# Patient Record
Sex: MALE | Race: WHITE | Employment: OTHER | URBAN - METROPOLITAN AREA
[De-identification: names, ages, dates, MRNs, and addresses within clinical notes are randomized per-mention and may not be internally consistent; named-entity substitution may affect disease eponyms.]

---

## 2019-03-18 ENCOUNTER — OFFICE VISIT (OUTPATIENT)
Dept: UROLOGY | Age: 69
End: 2019-03-18

## 2019-03-18 VITALS
SYSTOLIC BLOOD PRESSURE: 110 MMHG | HEART RATE: 73 BPM | OXYGEN SATURATION: 96 % | DIASTOLIC BLOOD PRESSURE: 74 MMHG | HEIGHT: 72 IN | WEIGHT: 202 LBS | BODY MASS INDEX: 27.36 KG/M2

## 2019-03-18 DIAGNOSIS — N13.8 BPH WITH OBSTRUCTION/LOWER URINARY TRACT SYMPTOMS: Primary | ICD-10-CM

## 2019-03-18 DIAGNOSIS — N40.1 BPH WITH OBSTRUCTION/LOWER URINARY TRACT SYMPTOMS: Primary | ICD-10-CM

## 2019-03-18 LAB
BILIRUB UR QL STRIP: NEGATIVE
GLUCOSE UR-MCNC: NEGATIVE MG/DL
KETONES P FAST UR STRIP-MCNC: NORMAL MG/DL
PH UR STRIP: 7 [PH] (ref 4.6–8)
PROT UR QL STRIP: NEGATIVE
SP GR UR STRIP: 1.02 (ref 1–1.03)
UA UROBILINOGEN AMB POC: NORMAL (ref 0.2–1)
URINALYSIS CLARITY POC: CLEAR
URINALYSIS COLOR POC: YELLOW
URINE BLOOD POC: NEGATIVE
URINE LEUKOCYTES POC: NEGATIVE
URINE NITRITES POC: NEGATIVE

## 2019-03-18 NOTE — PROGRESS NOTES
Mr. Chalino Garcia has a reminder for a \"due or due soon\" health maintenance. I have asked that he contact his primary care provider for follow-up on this health maintenance.

## 2019-03-19 NOTE — PROGRESS NOTES
[place] 70 y.o. male     Mr. Brennon Sánchez seen today for evaluation of obstructive and irritative voiding symptoms progressing to ernestine urinary retention 2 episodes most recently 2 weeks ago on alpha-blocker therapy with Flomax prompting presentation to the emergency room where Rodriguez catheter was passed and maintained for several days   Patient describes weak intermittent hesitant urinary stream with nocturia 2-3 episodes per night not significantly improved on Flomax Rx 1 year ago by primary physician  No history of  tract disease, trauma, or surgery  No history of fever chills dysuria or gross hematuria    PSA 2.6 in April 2018    PVR 25 cc in March 2019    Creatinine 1.0 in May 2018      Review of Systems:   CNS no seizures syncope headaches dizziness or visual changes:   Respiratory: No wheezing shortness of breath chest pain or coughing  Cardiovascular: No palpitations no angina  Intestinal: No dyspepsia diarrhea or constipation   urinary: Urgency frequency and nocturia with weak hesitant intermittent urinary stream  Skeletal: No bone or joint pain  Endocrine: No diabetes or thyroid disease  Other:    Allergies: Allergies   Allergen Reactions    Nsaids (Non-Steroidal Anti-Inflammatory Drug) Anaphylaxis      Medications:    Current Outpatient Medications   Medication Sig Dispense Refill    nitrofurantoin, macrocrystal-monohydrate, (MACROBID) 100 mg capsule Take 1 Cap by mouth two (2) times a day for 7 days. 14 Cap 0    tamsulosin (FLOMAX) 0.4 mg capsule Take 0.4 mg by mouth daily.  multivitamin (ONE A DAY) tablet Take 1 Tab by mouth daily.          Past Medical History:   Diagnosis Date    Diverticulitis     Prostate enlargement     per pt last physical (2018) was told \"slightly enlarged\"      Past Surgical History:   Procedure Laterality Date    HX KNEE REPLACEMENT Bilateral     HX ROTATOR CUFF REPAIR Bilateral     HX SMALL BOWEL RESECTION      PART REMOVAL COLON W ANASTOMOSIS Social History     Socioeconomic History    Marital status:      Spouse name: Not on file    Number of children: Not on file    Years of education: Not on file    Highest education level: Not on file   Social Needs    Financial resource strain: Not on file    Food insecurity - worry: Not on file    Food insecurity - inability: Not on file    Transportation needs - medical: Not on file   [place] needs - non-medical: Not on file   Occupational History    Not on file   Tobacco Use    Smoking status: Never Smoker    Smokeless tobacco: Never Used   Substance and Sexual Activity    Alcohol use: Yes     Alcohol/week: 2.4 oz     Types: 4 Cans of beer per week    Drug use: No    Sexual activity: Not on file   Other Topics Concern    Not on file   Social History Narrative    Not on file      History reviewed. No pertinent family history.      Physical Examination: Well-nourished mature male in no apparent distress    Abdomen is nontender no palpable masses no organomegaly  Back-no percussion CVA tenderness on either side  No inguinal hernia or adenopathy  Penis is normal  Testes are normal in size shape and consistency bilaterally-no epididymal induration or tenderness on either side  Spermatic cords are palpably normal bilaterally  Scrotum is normal  Prostate by VAHID is rounded, smooth, benign in consistency and nontender-no induration no nodularity  No rectal masses tenderness or induration      Urinalysis: Negative dipstick/nitrite negative/heme-negative    PVR today 25 cc                                    prostate volume 57 cc    Impression: Symptomatic BPH progressing to ernestine urinary retention on alpha-blocker Rx        Plan: TURP is indicated and recommended    Described discussed technique of TURP including Dr. Valente Florez illustrations    PC 3 weeks for cystoscopy/      Fouzia Avery MD  -electronically signed-    PLEASE NOTE:  This document has been produced using voice recognition software. Unrecognized errors in transcription may be present.

## 2019-03-25 ENCOUNTER — OFFICE VISIT (OUTPATIENT)
Dept: UROLOGY | Age: 69
End: 2019-03-25

## 2019-03-25 VITALS
SYSTOLIC BLOOD PRESSURE: 133 MMHG | WEIGHT: 202 LBS | HEART RATE: 72 BPM | DIASTOLIC BLOOD PRESSURE: 78 MMHG | HEIGHT: 72 IN | BODY MASS INDEX: 27.36 KG/M2 | OXYGEN SATURATION: 97 %

## 2019-03-25 DIAGNOSIS — R33.8 BENIGN PROSTATIC HYPERPLASIA WITH URINARY RETENTION: Primary | ICD-10-CM

## 2019-03-25 DIAGNOSIS — N40.1 URINARY RETENTION DUE TO BENIGN PROSTATIC HYPERPLASIA: ICD-10-CM

## 2019-03-25 DIAGNOSIS — N40.1 BENIGN PROSTATIC HYPERPLASIA WITH URINARY RETENTION: Primary | ICD-10-CM

## 2019-03-25 DIAGNOSIS — R33.8 URINARY RETENTION DUE TO BENIGN PROSTATIC HYPERPLASIA: ICD-10-CM

## 2019-03-25 LAB
BILIRUB UR QL STRIP: NEGATIVE
GLUCOSE UR-MCNC: NEGATIVE MG/DL
KETONES P FAST UR STRIP-MCNC: ABNORMAL MG/DL
PH UR STRIP: 5 [PH] (ref 4.6–8)
PROT UR QL STRIP: NEGATIVE
SP GR UR STRIP: 1.03 (ref 1–1.03)
UA UROBILINOGEN AMB POC: ABNORMAL (ref 0.2–1)
URINALYSIS CLARITY POC: CLEAR
URINALYSIS COLOR POC: YELLOW
URINE BLOOD POC: NEGATIVE
URINE LEUKOCYTES POC: NEGATIVE
URINE NITRITES POC: NEGATIVE

## 2019-03-25 RX ORDER — CIPROFLOXACIN 750 MG/1
750 TABLET, FILM COATED ORAL 2 TIMES DAILY
Qty: 20 TAB | Refills: 0 | Status: SHIPPED | OUTPATIENT
Start: 2019-03-25 | End: 2019-04-04

## 2019-03-25 NOTE — PROGRESS NOTES
Jim Haque 70 y.o. male     Mr. Perez Cos seen today for cystoscopy assessing symptomatic bladder outlet obstruction progressing to ernestine retention responding favorably to alpha-blocker therapy  Here today for assessment of prostate size and configuration  obstructive and irritative voiding symptoms progressing to ernestine urinary retention 2 episodes most recently 2 weeks ago on alpha-blocker therapy with Flomax prompting presentation to the emergency room where Rodriguez catheter was passed and maintained for several days   Patient describes weak intermittent hesitant urinary stream with nocturia 2-3 episodes per night not significantly improved on Flomax Rx 1 year ago by primary physician  No history of  tract disease, trauma, or surgery  No history of fever chills dysuria or gross hematuria     PSA 2.6 in April 2018     PVR 25 cc in March 2019     Creatinine 1.0 in May 2018     Review of Systems:   CNS no seizures syncope headaches dizziness or visual changes:   Respiratory: No wheezing shortness of breath chest pain or coughing  Cardiovascular: No palpitations no angina  Intestinal: No dyspepsia diarrhea or constipation   urinary: Urgency frequency and nocturia with weak hesitant intermittent urinary stream  Skeletal: No bone or joint pain  Endocrine: No diabetes or thyroid disease  Other:      Allergies: Allergies   Allergen Reactions    Nsaids (Non-Steroidal Anti-Inflammatory Drug) Anaphylaxis and Swelling      Medications:    Current Outpatient Medications   Medication Sig Dispense Refill    tamsulosin (FLOMAX) 0.4 mg capsule Take 0.4 mg by mouth daily.  multivitamin (ONE A DAY) tablet Take 1 Tab by mouth daily.          Past Medical History:   Diagnosis Date    Diverticulitis     Prostate enlargement     per pt last physical (2018) was told \"slightly enlarged\"      Past Surgical History:   Procedure Laterality Date    HX KNEE REPLACEMENT Bilateral     HX ROTATOR CUFF REPAIR Bilateral     HX SMALL BOWEL RESECTION      PART REMOVAL COLON W ANASTOMOSIS       Social History     Socioeconomic History    Marital status:      Spouse name: Not on file    Number of children: Not on file    Years of education: Not on file    Highest education level: Not on file   Occupational History    Not on file   Social Needs    Financial resource strain: Not on file    Food insecurity:     Worry: Not on file     Inability: Not on file    Transportation needs:     Medical: Not on file     Non-medical: Not on file   Tobacco Use    Smoking status: Never Smoker    Smokeless tobacco: Never Used   Substance and Sexual Activity    Alcohol use: Yes     Alcohol/week: 2.4 oz     Types: 4 Cans of beer per week    Drug use: No    Sexual activity: Not on file   Lifestyle    Physical activity:     Days per week: Not on file     Minutes per session: Not on file    Stress: Not on file   Relationships    Social connections:     Talks on phone: Not on file     Gets together: Not on file     Attends [employer] service: Not on file     Active member of club or organization: Not on file     Attends meetings of clubs or organizations: Not on file     Relationship status: Not on file    Intimate partner violence:     Fear of current or ex partner: Not on file     Emotionally abused: Not on file     Physically abused: Not on file     Forced sexual activity: Not on file   Other Topics Concern    Not on file   Social History Narrative    Not on file      History reviewed. No pertinent family history. Physical Examination: Well-nourished mature male in no apparent distress    Large benign consistency prostate by VAHID      Urinalysis: Negative heme/negative nitrite    Cystoscopy Report: After prepping the glans penis with Betadine solution and instilling 2% lidocaine jelly intraurethrally a flexible cystoscope was passed through the urethra into the bladder revealing normal urothelium throughout.   There are no strictures, stones, tumors, or diverticuli. Moderate trabeculation is noted throughout without cellule formation. There is moderate enlargement of the median and anterior lobes protruding mildly intravesically with marked injection and friability of blood vessels in the bladder outlet-ernestine bleeding prompted by simple touch of the cystoscope tip ureteral orifices are both slitlike in appearance with clear urine jets observed from both sides. The lateral lobes of the prostate are tightly coapting in the midline-estimated prostate channel length 5-1/2 cm  Procedure was uncomplicated and well-tolerated  EBL- minimal  Specimen-none      Impression: Anatomic obstruction of bladder outlet secondary to BPH        Plan: Cipro 500 mg twice daily times 3 days    TURP is indicated and recommended    Today regarding technique of TURP-Dr. Ctaalan Plater illustrations utilized-refer to Internet for video depiction of TURP procedure RTC    3 months for scheduling up TURP went patient's activity schedule accommodates this procedure more readily      More than 1/2 of this 25 minute visit was spent in counselling and coordination of care, as described above. Peggy Mac MD  -electronically signed-    PLEASE NOTE:  This document has been produced using voice recognition software. Unrecognized errors in transcription may be present.

## 2019-03-25 NOTE — PATIENT INSTRUCTIONS
Cystoscopy: Care Instructions  Your Care Instructions  Cystoscopy is a test. It uses a thin, lighted tube called a cystoscope to see the inside of the bladder and the urethra. The urethra is the tube that carries urine out of the body. This test is helpful because it lets your doctor see areas of your bladder and urethra that are hard to see on X-rays. It can help your doctor find bladder stones, tumors, bleeding, and infection. During this test, your doctor also can take tissue and urine samples. And if your doctor finds small stones or growths, he or she can remove them. In most cases the scope is in the bladder for less than 10 minutes. But the entire test may take 45 minutes or longer. You will probably get local anesthesia. This numbs a small part of your body. Or you may get spinal anesthesia, which numbs more of your body. Once in a while, doctors use general anesthesia. It makes you sleep during surgery. If you get a local anesthetic, you may be able to get up right after the test. But if you had spinal or general anesthesia, you will stay in the recovery room until you are able to walk or you have feeling again in your lower body. This usually takes about an hour. Your doctor may be able to tell you some of the results right after the test. But the complete results may take several days. Follow-up care is a key part of your treatment and safety. Be sure to make and go to all appointments, and call your doctor if you are having problems. It's also a good idea to know your test results and keep a list of the medicines you take. How can you care for yourself at home? Before the test  · If you are having a local anesthetic, you can eat and drink before the test.  · If you are having a spinal or general anesthetic, do not eat or drink anything for at least 8 hours before the test. Tell your doctor what medicines you take.   · If you are not staying overnight in the hospital, make sure you have someone who can drive you home after the test.  After the test  · If your doctor prescribed antibiotics, take them as directed. Do not stop taking them just because you feel better. You need to take the full course of antibiotics. · You may have some burning when you urinate for a day or two after the test. You may feel better if you drink more fluids. This may also help prevent an infection. · Your urine may have a pinkish color for a few days after the test.  When should you call for help? Call your doctor now or seek immediate medical care if:    · Your urine is still red or you see blood clots after you have urinated several times.     · You cannot pass urine 8 hours after the test.     · You get a fever or chills.     · You have pain in your belly or your back just below your rib cage. This is also called flank pain.    Watch closely for changes in your health, and be sure to contact your doctor if:    · You have pain or burning when you urinate. A burning sensation is normal for a day or two after the test. But call if it does not get better.     · You have a frequent urge to urinate but can pass only small amounts of urine.     · Your urine is pink, red, or cloudy or smells bad. It is normal for the urine to have a pinkish color for a few days after the test. But call if it does not get better.     · You do not get better as expected. Where can you learn more? Go to http://uche-sonja.info/. Enter C693 in the search box to learn more about \"Cystoscopy: Care Instructions. \"  Current as of: March 20, 2018  Content Version: 11.9  © 4810-7881 [employer]. Care instructions adapted under license by [employer] (which disclaims liability or warranty for this information).  If you have questions about a medical condition or this instruction, always ask your healthcare professional. [address] any warranty or liability for your use of this information.

## 2019-03-25 NOTE — PROGRESS NOTES
Mr. Vj Kirkpatrick has a reminder for a \"due or due soon\" health maintenance. I have asked that he contact his primary care provider for follow-up on this health maintenance.